# Patient Record
Sex: FEMALE | Race: WHITE | NOT HISPANIC OR LATINO | Employment: FULL TIME
[De-identification: names, ages, dates, MRNs, and addresses within clinical notes are randomized per-mention and may not be internally consistent; named-entity substitution may affect disease eponyms.]

---

## 2017-10-08 ENCOUNTER — HEALTH MAINTENANCE LETTER (OUTPATIENT)
Age: 52
End: 2017-10-08

## 2023-04-05 ENCOUNTER — MEDICAL CORRESPONDENCE (OUTPATIENT)
Dept: HEALTH INFORMATION MANAGEMENT | Facility: CLINIC | Age: 58
End: 2023-04-05

## 2023-05-18 ENCOUNTER — OFFICE VISIT (OUTPATIENT)
Dept: RHEUMATOLOGY | Facility: CLINIC | Age: 58
End: 2023-05-18
Payer: COMMERCIAL

## 2023-05-18 ENCOUNTER — LAB (OUTPATIENT)
Dept: LAB | Facility: CLINIC | Age: 58
End: 2023-05-18
Payer: COMMERCIAL

## 2023-05-18 VITALS
SYSTOLIC BLOOD PRESSURE: 128 MMHG | HEART RATE: 68 BPM | HEIGHT: 62 IN | BODY MASS INDEX: 30.91 KG/M2 | WEIGHT: 168 LBS | DIASTOLIC BLOOD PRESSURE: 72 MMHG

## 2023-05-18 DIAGNOSIS — R53.83 OTHER FATIGUE: ICD-10-CM

## 2023-05-18 DIAGNOSIS — R76.8 POSITIVE ANA (ANTINUCLEAR ANTIBODY): ICD-10-CM

## 2023-05-18 DIAGNOSIS — R41.3 MEMORY CHANGES: ICD-10-CM

## 2023-05-18 DIAGNOSIS — M25.50 POLYARTHRALGIA: Primary | ICD-10-CM

## 2023-05-18 DIAGNOSIS — M25.50 POLYARTHRALGIA: ICD-10-CM

## 2023-05-18 LAB
CRP SERPL-MCNC: 6.48 MG/L
ERYTHROCYTE [SEDIMENTATION RATE] IN BLOOD BY WESTERGREN METHOD: 22 MM/HR (ref 0–30)
Lab: NORMAL
PERFORMING LABORATORY: NORMAL
SPECIMEN STATUS: NORMAL
TEST NAME: NORMAL
TOTAL PROTEIN SERUM FOR ELP: 6.9 G/DL (ref 6.4–8.3)
URATE SERPL-MCNC: 3 MG/DL (ref 2.4–5.7)

## 2023-05-18 PROCEDURE — 86140 C-REACTIVE PROTEIN: CPT

## 2023-05-18 PROCEDURE — 84550 ASSAY OF BLOOD/URIC ACID: CPT

## 2023-05-18 PROCEDURE — 86334 IMMUNOFIX E-PHORESIS SERUM: CPT

## 2023-05-18 PROCEDURE — 83516 IMMUNOASSAY NONANTIBODY: CPT

## 2023-05-18 PROCEDURE — 99204 OFFICE O/P NEW MOD 45 MIN: CPT | Performed by: PHYSICIAN ASSISTANT

## 2023-05-18 PROCEDURE — 86235 NUCLEAR ANTIGEN ANTIBODY: CPT

## 2023-05-18 PROCEDURE — 36415 COLL VENOUS BLD VENIPUNCTURE: CPT

## 2023-05-18 PROCEDURE — 84155 ASSAY OF PROTEIN SERUM: CPT

## 2023-05-18 PROCEDURE — 86037 ANCA TITER EACH ANTIBODY: CPT

## 2023-05-18 PROCEDURE — 86376 MICROSOMAL ANTIBODY EACH: CPT

## 2023-05-18 PROCEDURE — 86036 ANCA SCREEN EACH ANTIBODY: CPT

## 2023-05-18 PROCEDURE — 85652 RBC SED RATE AUTOMATED: CPT

## 2023-05-18 PROCEDURE — 84165 PROTEIN E-PHORESIS SERUM: CPT

## 2023-05-18 RX ORDER — METHYLPHENIDATE HYDROCHLORIDE 10 MG/1
10 TABLET ORAL 2 TIMES DAILY
COMMUNITY

## 2023-05-18 RX ORDER — PREGABALIN 100 MG/1
100 CAPSULE ORAL 3 TIMES DAILY
COMMUNITY

## 2023-05-18 RX ORDER — POLYETHYLENE GLYCOL 3350 17 G/17G
1 POWDER, FOR SOLUTION ORAL DAILY
COMMUNITY

## 2023-05-18 RX ORDER — TRAMADOL HYDROCHLORIDE 100 MG/1
1 TABLET, EXTENDED RELEASE ORAL AT BEDTIME
COMMUNITY
Start: 2023-04-14

## 2023-05-18 RX ORDER — FLUTICASONE PROPIONATE 50 MCG
2 SPRAY, SUSPENSION (ML) NASAL DAILY
COMMUNITY

## 2023-05-18 RX ORDER — NYSTATIN 10B UNIT
POWDER (EA) MISCELLANEOUS
COMMUNITY

## 2023-05-18 RX ORDER — HYDROCORTISONE 25 MG/G
OINTMENT TOPICAL 2 TIMES DAILY
COMMUNITY

## 2023-05-18 RX ORDER — TOPIRAMATE 25 MG/1
25 TABLET, FILM COATED ORAL 2 TIMES DAILY
COMMUNITY

## 2023-05-18 RX ORDER — LURASIDONE HYDROCHLORIDE 20 MG/1
20 TABLET, FILM COATED ORAL
COMMUNITY

## 2023-05-18 RX ORDER — KETOCONAZOLE 20 MG/G
CREAM TOPICAL 2 TIMES DAILY
COMMUNITY

## 2023-05-18 RX ORDER — FLUOCINONIDE 0.5 MG/G
OINTMENT TOPICAL 2 TIMES DAILY
COMMUNITY

## 2023-05-18 RX ORDER — DULOXETIN HYDROCHLORIDE 60 MG/1
120 CAPSULE, DELAYED RELEASE ORAL DAILY
COMMUNITY

## 2023-05-18 NOTE — PROGRESS NOTES
Rheumatology Clinic Visit  [facility]  Deya Garcia KERI     Carmel Woods MRN# [id number]   YOB: 1965 Age: 57 year old   Date of Visit: 05/18/2023  Primary care provider: David Reza          Assessment and Plan:     1.  Polyarthralgia  2.  Positive PATRICIA  3.  Fatigue  4.  Memory changes    Patient presents today for a second opinion regarding her polyarthralgia.  She has been working with another rheumatologist for around 15 years regarding her joint pain.  Initially was thought to be secondary to his psoriatic arthritis however there has not been any documentation regarding inflammation.  Therefore the risk of the medication was thought to outweigh the potential benefit.  Her last medication that she was on was Taltz.  4 to 6 months being taken off of the Taltz she started to have some other issues including GI symptoms and memory issues.  She is concerned that she has a lot of inflammation in her body causing the symptoms.  She has had an increase in fatigue.  She states that recently her boss called her into their office to discuss their concerns about her memory.  She is wondering if she needs to have a brain MRI to evaluate for inflammation on her brain.  She has not seen her primary care provider for any of the symptoms.  Physical examination today did not show any active synovitis, dactylitis, tenosynovitis or enthesopathy.  No mucositis.  No skin rashes were noted.  She had full joint range of motion.  Some tenderness with range of motion of her left shoulder, chronic.  Previous laboratory evaluations and imaging studies were reviewed.  Results below.    Discussed the difference between inflammatory versus noninflammatory arthritis with the patient today.  We did also discuss the specialty of rheumatology and what it is that rheumatology to look for with regards to the immune system.  She does also carry a diagnosis of fibromyalgia, we discussed that this is a diagnosis  of exclusion but could certainly explain some of the symptoms that she is having.  I do not think the discontinuation of the Taltz is the cause of her current GI issues as the half-life of it is 13 days and she has had symptoms that started 4 to 6 months after the discontinuation of the medication.  With regards to her memory issues certainly there can be brain fog associated with fibromyalgia, but I do think she needs to follow-up with her primary care provider to discuss this further.    We will check some of the antibody testing and other labs that have not been checked thus far.  If these all returned normal/negative I would agree with her other rheumatologist to that there is no evidence of an autoimmune condition.  She is scheduled for an MRI of her hand next week which I would encourage her to keep and we discussed why an MRI can be more sensitive at picking of inflammation than an x-ray.  I will contact the patient with the results of the evaluation from today once they are complete.    Plan:     1. Schedule follow-up with Deya Garcia PA-C as needed.  2. Imaging: keep MRI as scheduled  3. Labs: ANCA, RNA polymerase 3, sed rate body, CRP, cryoglobulin quantitative and identification, centromere antibody, sed rate, protein electrophoresis, protein immunofixation, SCL 70, thyroid peroxidase antibody, uric acid    Deya Garcia, PAC  Rheumatology         History of Present Illness:   Carmel Woods presents for evaluation of joint pain, elevated CRP. History of psoriasis.      She does not agree that she does not have an autoimmune cause. She states that since coming off of Taltz she has had multiple issues. She states that she has been having upper gastric issues, diarrhea for 3-4 months. This started 4-6 months after coming off the Taltz. it has now changed to constipation. She has seen GI and had an Upper Gi. She will be getting an colonoscopy. She has aching in her elbows at night. When she sleeps at  "night and is on the left side, she states that she \"cannot swallow\". She states that she has to lift her head to swallow. She states that she does not feel this is anxiety. She states that her boss recently told her that there are concerns about her memory. She states that 1 month later she feels that she is having memory issues. She states that she is concerned that there is inflammation in her brain. She states that she has a lot of fatigue. She states that she has never had these before. She feels that saying this is fibromyalgia is not correct. She feels that she is always fighting a fever, but knows she does not have one. She feels that she has periods of flares and then not.     She has a skin rash on her hands. Dermatology has told her it is a form of dermatitis, no follow up has been scheduled. One of her nails has questioned if there is psoriasis. She has not had any biopsies of her skin. She states that it will itch and become pustular. This will go through cycles as well. She will have a week where it is good and then there will be pustules again.      She is scheduled for a MRI of her hand next week. She feels that with switching her medicines every few years has helped prevent damage from her joints.     Rheumatological history:  Provider(s): Dr. Jim  Last office visit: 05/04/2023  Pertinent lab history: Positive PATRICIA, negative subsets, negative RF, negative CCP antibody, normal sed rate, elevated CRP  Previous medications tried: Arava, Remicade, orencia, methotrexate, simponi, Consetyx, Taltz  Current medications: None    Sister with RA, brother and mom with psoriatic arthritis.           Review of Systems:     Constitutional: negative  Skin: negative  Eyes: negative  Ears/Nose/Throat: negative  Respiratory: No shortness of breath, dyspnea on exertion, cough, or hemoptysis  Cardiovascular: negative  Gastrointestinal: negative  Genitourinary: negative  Musculoskeletal: as above  Neurologic: " negative  Psychiatric: negative  Hematologic/Lymphatic/Immunologic: negative  Endocrine: negative         Active Problem List:     Patient Active Problem List    Diagnosis Date Noted     CARDIOVASCULAR SCREENING; LDL GOAL LESS THAN 160 10/31/2010     Priority: Medium            Past Medical History:   No past medical history on file.  No past surgical history on file.         Social History:     Social History     Socioeconomic History     Marital status:      Spouse name: Not on file     Number of children: Not on file     Years of education: Not on file     Highest education level: Not on file   Occupational History     Not on file   Tobacco Use     Smoking status: Every Day     Packs/day: 1.00     Years: 27.00     Pack years: 27.00     Types: Cigarettes     Smokeless tobacco: Never   Vaping Use     Vaping status: Not on file   Substance and Sexual Activity     Alcohol use: Yes     Comment: occasionally     Drug use: No     Sexual activity: Yes     Partners: Female   Other Topics Concern     Parent/sibling w/ CABG, MI or angioplasty before 65F 55M? Not Asked   Social History Narrative     Not on file     Social Determinants of Health     Financial Resource Strain: Not on file   Food Insecurity: Not on file   Transportation Needs: Not on file   Physical Activity: Not on file   Stress: Not on file   Social Connections: Not on file   Intimate Partner Violence: Not on file   Housing Stability: Not on file          Family History:     Family History   Problem Relation Age of Onset     Thyroid Disease Mother      Heart Disease Mother      Arthritis Mother      Cancer Father 70        Prostate ca     Arthritis Brother      Arthritis Sister      Diabetes Sister      Thyroid Disease Sister      Hypertension Brother             Allergies:   No Known Allergies         Medications:     Current Outpatient Medications   Medication Sig Dispense Refill     Abatacept (ORENCIA IV) Inject  into the vein every 28 days.        cyclobenzaprine (FLEXERIL) 10 MG tablet Take 10 mg by mouth daily.       escitalopram (LEXAPRO) 20 MG tablet Take 30 mg by mouth daily. Take 1 and 1/2 tab daily       folic acid (FOLVITE) 400 MCG tablet Take 1,200 mcg by mouth daily.       lorazepam (ATIVAN) 0.5 MG tablet Take 0.5 mg by mouth At Bedtime.       METHOTREXate sodium 50mg/mL SOLR Inject 0.6 mg as directed once a week.       Multiple Vitamin (MULTI-VITAMIN PO) Take  by mouth daily.       TraMADol HCl 300 MG TB24 Take 300 mg by mouth daily.              Physical Exam:   There were no vitals taken for this visit.  Wt Readings from Last 6 Encounters:   10/20/11 63 kg (139 lb)     Constitutional: well-developed, appearing stated age; cooperative  Eyes: nl PERRLA, conjunctiva, sclera  ENT: nl external ears, nose, hearing, lips, teeth, gums, throat. No mucositis.   No mucous membrane lesions, normal saliva pool  Neck: no mass or thyroid enlargement  Resp: lungs clear to auscultation  CV: RRR, no murmurs, rubs or gallops, no edema  Lymph: no cervical, supraclavicular or epitrochlear nodes  MS: The TMJ, neck, shoulder, elbow, wrist, MCP/PIP/DIP, spine,  knee, ankle, and foot MTP/IP joints were examined and found normal. No active synovitis or altered joint anatomy. Full joint ROM tenderness with range of motion of her left shoulder, chronic. Normal  strength. No dactylitis,  tenosynovitis, enthesopathy.   Skin: no nail pitting, alopecia, nodules or lesions.  She does have skin peeling on the palms of her hands  Psych: nl judgement, orientation, memory, affect.           Data:   Imaging:  MRI right foot 11/27/2019  Impression:   1. Moderate osteoarthrosis and bunion at the first MTP joint.   2. No evidence of inflammatory arthropathy.   3. No evidence of plantar fascia pathology in the right foot.    MRI left foot 11/27/2019  Impression:   1. Moderate osteoarthrosis of the first MTP joint.   2. No evidence of inflammatory arthropathy or synovitis.   3.  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 There is also mild   edema adjacent to the plantar fascia at its origin, so a mild degree   of plantar fasciitis cannot be ruled out.    MRI hips 11/27/2019  IMPRESSION:   1. Mild osteoarthrosis in the left hip and moderate osteoarthrosis in   the right hip.   2. Ischiofemoral space narrowing bilaterally, but no MRI evidence of   ischiofemoral inflammation/bursitis.   3. Otherwise negative pelvis and bilateral hip MRI. No evidence of   inflammatory arthropathy.        MRI cervical spine 3/16/2022  IMPRESSION:   1.  Generally stable cervical degenerative changes, although there is   mild to moderate left foraminal narrowing at C6-C7, which may have   slightly progressed. Correlate for left C7 nerve root involvement. No   abnormal cord signal. Most significant findings are from C4-C5 and   C6-C7 levels.    Hand x-ray 5/4/2023  Impression: No inflammatory arthritis or erosions.  No chondrocalcinosis.  Minor degenerative arthrosis in the DIP joints of the fingers    Laboratory:  1/23/2023  CK 49    2/11/2023  Urine negative for protein  CBC within normal limits  Vitamin D 41.8  TSH 3.2  Rheumatoid factor less than 7  CCP antibody less than 4.6  Double-stranded DNA negative  C3 191  C4 39  PATRICIA positive with a homogenous pattern and a titer of 1:320  Ruiz antibody negative  SSA and SSB negative  RNP negative  CRP 1.5 (normal is less than 0.5)  Sed rate 17  Cardiolipin antibodies negative  Beta-2 glycoprotein antibodies negative  Lupus anticoagulant negative     Unstructured MSE

## 2023-05-18 NOTE — PATIENT INSTRUCTIONS
After Visit Instructions:     Thank you for coming to [facility] for your care. It is my goal to partner with you to help you reach your optimal state of health.       Plan:     Schedule follow-up with Deya Garcia PA-C as needed.  Imaging: keep MRI as scheduled  Labs: see below        Deya Garcia PA-C  [facility]  [facility]    Contact information: [facility]  Clinic Number:  [phone]  Please call or send a [employer] message with any questions about your care

## 2023-05-19 ENCOUNTER — TELEPHONE (OUTPATIENT)
Dept: LAB | Facility: CLINIC | Age: 58
End: 2023-05-19

## 2023-05-19 LAB
ALBUMIN SERPL ELPH-MCNC: 3.9 G/DL (ref 3.7–5.1)
ALPHA1 GLOB SERPL ELPH-MCNC: 0.4 G/DL (ref 0.2–0.4)
ALPHA2 GLOB SERPL ELPH-MCNC: 0.9 G/DL (ref 0.5–0.9)
ANCA AB PATTERN SER IF-IMP: NORMAL
B-GLOBULIN SERPL ELPH-MCNC: 0.9 G/DL (ref 0.6–1)
C-ANCA TITR SER IF: NORMAL {TITER}
CENTROMERE B AB SER-ACNC: <0.7 U/ML
CENTROMERE B AB SER-ACNC: NEGATIVE
ENA SCL70 IGG SER IA-ACNC: <0.6 U/ML
ENA SCL70 IGG SER IA-ACNC: NEGATIVE
GAMMA GLOB SERPL ELPH-MCNC: 0.8 G/DL (ref 0.7–1.6)
M PROTEIN SERPL ELPH-MCNC: 0 G/DL
PROT PATTERN SERPL ELPH-IMP: NORMAL
PROT PATTERN SERPL IFE-IMP: NORMAL
THYROPEROXIDASE AB SERPL-ACNC: <10 IU/ML

## 2023-05-19 NOTE — PROGRESS NOTES
Called patient on 05/19 to inform her about the canceled test. Each time I called no answer. Will try again later and will leave a message to call back to get schedule for redraw.

## 2023-05-21 LAB — MISCELLANEOUS TEST 1 (ARUP): NORMAL

## 2023-06-02 ENCOUNTER — HEALTH MAINTENANCE LETTER (OUTPATIENT)
Age: 58
End: 2023-06-02

## 2023-12-03 ENCOUNTER — HEALTH MAINTENANCE LETTER (OUTPATIENT)
Age: 58
End: 2023-12-03

## 2024-04-21 ENCOUNTER — HEALTH MAINTENANCE LETTER (OUTPATIENT)
Age: 59
End: 2024-04-21

## 2025-05-11 ENCOUNTER — HEALTH MAINTENANCE LETTER (OUTPATIENT)
Age: 60
End: 2025-05-11